# Patient Record
(demographics unavailable — no encounter records)

---

## 2025-07-17 NOTE — HISTORY OF PRESENT ILLNESS
[Mother] : mother [Vitamin] : Primary Fluoride Source: Vitamin [Needs Immunizations] : Needs immunizations [Eats meals with family] : eats meals with family [Has family members/adults to turn to for help] : has family members/adults to turn to for help [Is permitted and is able to make independent decisions] : Is permitted and is able to make independent decisions [Grade: ____] : Grade: [unfilled] [Eats regular meals including adequate fruits and vegetables] : eats regular meals including adequate fruits and vegetables [Drinks non-sweetened liquids] : drinks non-sweetened liquids  [Calcium source] : calcium source [Has concerns about body or appearance] : has concerns about body or appearance [Uses safety belts/safety equipment] : uses safety belts/safety equipment  [Impaired/distracted driving] : impaired/distracted driving [Has peer relationships free of violence] : has peer relationships free of violence [No] : Patient has not had sexual intercourse [Has ways to cope with stress] : has ways to cope with stress [Displays self-confidence] : displays self-confidence [Has problems with sleep] : has problems with sleep [NO] : No [Sleep Concerns] : no sleep concerns [Uses electronic nicotine delivery system] : does not use electronic nicotine delivery system [Exposure to electronic nicotine delivery system] : no exposure to electronic nicotine delivery system [Uses tobacco] : does not use tobacco [Exposure to tobacco] : no exposure to tobacco [Uses drugs] : does not use drugs  [Exposure to drugs] : no exposure to drugs [Drinks alcohol] : does not drink alcohol [Exposure to alcohol] : no exposure to alcohol [Gets depressed, anxious, or irritable/has mood swings] : does not get depressed, anxious, or irritable/has mood swings [Has thought about hurting self or considered suicide] : has not thought about hurting self or considered suicide [FreeTextEntry7] : well [de-identified] : developmental  delay [FreeTextEntry1] : Well 11 year old female Discussed growth and development: normal Discussed safety/anticipatory guidance Discussed puberty/adolescence/menses Discussed need for vaccines, reviewed side effects and VIS Next PE: 1 year  Discussed and/or provided information on the following: PHYSICAL GROWTH AND DEVELOPMENT: Physical and oral health, body image, healthy eating, physical activity SOCIAL AND ACADEMIC COMPETENCE: Connectedness with family, peers, and community; interpersonal relationships; school performance EMOTIONAL WELL-BEING: Coping, mood regulation and mental health (depression), sexuality RISK REDUCTION: Tobacco, alcohol, or other drugs; pregnancy; STIs VIOLENCE AND INJURY PREVENTION: Safety belt and helmet use; substance abuse and riding in a vehicle; guns; interpersonal violence (fights); bullying PHYSICAL ACTIVITY: Adequate physical activity in organized sports, after-school programs, fun activities; limits on screen time

## 2025-07-17 NOTE — PHYSICAL EXAM

## 2025-07-17 NOTE — DISCUSSION/SUMMARY
[Continue Regimen] : feeding [No Skin Concerns] : skin [Normal Sleep Pattern] : sleep [Anticipatory Guidance Given] : Anticipatory guidance addressed as per the history of present illness section [No Medications] : ~He/She~ is not on any medications [de-identified] : delay [de-identified] : delay [FreeTextEntry1] : Well 11 year old female Discussed growth and development: normal Discussed safety/anticipatory guidance Discussed puberty/adolescence/menses Discussed need for vaccines, reviewed side effects and VIS Next PE: 1 year  Discussed and/or provided information on the following: PHYSICAL GROWTH AND DEVELOPMENT: Physical and oral health, body image, healthy eating, physical activity SOCIAL AND ACADEMIC COMPETENCE: Connectedness with family, peers, and community; interpersonal relationships; school performance EMOTIONAL WELL-BEING: Coping, mood regulation and mental health (depression), sexuality RISK REDUCTION: Tobacco, alcohol, or other drugs; pregnancy; STIs VIOLENCE AND INJURY PREVENTION: Safety belt and helmet use; substance abuse and riding in a vehicle; guns; interpersonal violence (fights); bullying PHYSICAL ACTIVITY: Adequate physical activity in organized sports, after-school programs, fun activities; limits on screen time

## 2025-07-17 NOTE — REVIEW OF SYSTEMS
[Seizure] : seizures [Abnormal Movements] : abnormal movements [Weakness] : weakness [Restriction of Motion] : restriction of motion [Negative] : Gastrointestinal